# Patient Record
Sex: FEMALE | Race: WHITE | NOT HISPANIC OR LATINO | Employment: FULL TIME | URBAN - METROPOLITAN AREA
[De-identification: names, ages, dates, MRNs, and addresses within clinical notes are randomized per-mention and may not be internally consistent; named-entity substitution may affect disease eponyms.]

---

## 2024-02-07 ENCOUNTER — HOSPITAL ENCOUNTER (EMERGENCY)
Facility: HOSPITAL | Age: 19
Discharge: HOME | End: 2024-02-07
Payer: COMMERCIAL

## 2024-02-07 VITALS
SYSTOLIC BLOOD PRESSURE: 129 MMHG | OXYGEN SATURATION: 99 % | WEIGHT: 125 LBS | HEIGHT: 61 IN | HEART RATE: 80 BPM | RESPIRATION RATE: 16 BRPM | BODY MASS INDEX: 23.6 KG/M2 | DIASTOLIC BLOOD PRESSURE: 88 MMHG | TEMPERATURE: 98 F

## 2024-02-07 DIAGNOSIS — B34.9 VIRAL ILLNESS: Primary | ICD-10-CM

## 2024-02-07 LAB
FLUAV RNA RESP QL NAA+PROBE: NOT DETECTED
FLUBV RNA RESP QL NAA+PROBE: NOT DETECTED
SARS-COV-2 RNA RESP QL NAA+PROBE: NOT DETECTED

## 2024-02-07 PROCEDURE — 87636 SARSCOV2 & INF A&B AMP PRB: CPT

## 2024-02-07 PROCEDURE — 99283 EMERGENCY DEPT VISIT LOW MDM: CPT

## 2024-02-07 ASSESSMENT — COLUMBIA-SUICIDE SEVERITY RATING SCALE - C-SSRS
2. HAVE YOU ACTUALLY HAD ANY THOUGHTS OF KILLING YOURSELF?: NO
1. IN THE PAST MONTH, HAVE YOU WISHED YOU WERE DEAD OR WISHED YOU COULD GO TO SLEEP AND NOT WAKE UP?: NO
6. HAVE YOU EVER DONE ANYTHING, STARTED TO DO ANYTHING, OR PREPARED TO DO ANYTHING TO END YOUR LIFE?: NO

## 2024-02-07 ASSESSMENT — LIFESTYLE VARIABLES
HAVE PEOPLE ANNOYED YOU BY CRITICIZING YOUR DRINKING: NO
HAVE YOU EVER FELT YOU SHOULD CUT DOWN ON YOUR DRINKING: NO
EVER HAD A DRINK FIRST THING IN THE MORNING TO STEADY YOUR NERVES TO GET RID OF A HANGOVER: NO
EVER FELT BAD OR GUILTY ABOUT YOUR DRINKING: NO

## 2024-02-07 ASSESSMENT — PAIN - FUNCTIONAL ASSESSMENT: PAIN_FUNCTIONAL_ASSESSMENT: 0-10

## 2024-02-07 ASSESSMENT — PAIN SCALES - GENERAL: PAINLEVEL_OUTOF10: 0 - NO PAIN

## 2024-02-07 NOTE — ED PROVIDER NOTES
"HPI   Chief Complaint   Patient presents with    Flu Symptoms       Patient is a 18-year-old female with no reported past medical history who presents emergency room today with a complaint of flulike symptoms.  Patient states yesterday she felt \"pretty normal\" but this morning woke up with \"a cold\".  She endorses stuffy nose, generalized bodyaches and cough.  She states this afternoon while she was in class, she became extremely fatigued and had to \"lay down in the hallway.\"  She denies passing out or striking her head.  EMS was called and patient was brought to the emergency room.  She also complains of some diffuse neck pain.  She is up-to-date on all of her vaccinations.  She denies any chest pain, difficulty breathing, visual changes, nausea, vomiting, urinary urgency/frequency/burning, constipation or diarrhea.  No other complaints or concerns mentioned at this time.      History provided by:  Patient                      [place] Coma Scale Score: 15                     Patient History   No past medical history on file.  No past surgical history on file.  No family history on file.  Social History     Tobacco Use    Smoking status: Not on file    Smokeless tobacco: Not on file   Substance Use Topics    Alcohol use: Not on file    Drug use: Not on file       Physical Exam   ED Triage Vitals [02/07/24 1422]   Temperature Heart Rate Respirations BP   36.7 °C (98 °F) 80 16 129/88      Pulse Ox Temp src Heart Rate Source Patient Position   99 % -- -- --      BP Location FiO2 (%)     -- --       Physical Exam  Vitals and nursing note reviewed. Exam conducted with a chaperone present.   Constitutional:       General: She is not in acute distress.     Appearance: Normal appearance. She is normal weight. She is not ill-appearing, toxic-appearing or diaphoretic.   HENT:      Head: Normocephalic and atraumatic.      Nose: Congestion present.      Mouth/Throat:      Mouth: Mucous membranes are moist.      Pharynx: " "Oropharynx is clear. No oropharyngeal exudate or posterior oropharyngeal erythema.   Eyes:      Extraocular Movements: Extraocular movements intact.      Conjunctiva/sclera: Conjunctivae normal.      Pupils: Pupils are equal, round, and reactive to light.   Neck:      Comments: Generalized, diffuse tenderness.  No spinal or paraspinal tenderness  Cardiovascular:      Rate and Rhythm: Normal rate and regular rhythm.      Pulses: Normal pulses.      Heart sounds: Normal heart sounds.   Pulmonary:      Effort: Pulmonary effort is normal. No respiratory distress.      Breath sounds: Normal breath sounds. No stridor. No wheezing, rhonchi or rales.   Chest:      Chest wall: No tenderness.   Abdominal:      General: Abdomen is flat. Bowel sounds are normal.      Palpations: Abdomen is soft.   Genitourinary:     Comments: No CVA tenderness or pubic pain.  Musculoskeletal:         General: Normal range of motion.      Cervical back: Normal range of motion and neck supple. Tenderness present. No rigidity.   Lymphadenopathy:      Cervical: No cervical adenopathy.   Skin:     General: Skin is warm and dry.      Capillary Refill: Capillary refill takes less than 2 seconds.   Neurological:      General: No focal deficit present.      Mental Status: She is alert and oriented to person, place, and time. Mental status is at baseline.      Sensory: No sensory deficit.      Motor: No weakness.      Coordination: Coordination normal.      Gait: Gait normal.   Psychiatric:         Mood and Affect: Mood normal.         Judgment: Judgment normal.         ED Course & MDM   Diagnoses as of 02/07/24 1617   Viral illness       Medical Decision Making  Patient is a 18-year-old female with no reported past medical history who presents emergency room today with a complaint of flulike symptoms.  Patient states yesterday she felt \"pretty normal\" but this morning woke up with \"a cold\".  She endorses stuffy nose, generalized bodyaches and cough.  " "She states this afternoon while she was in class, she became extremely fatigued and had to \"lay down in the hallway.\"  She denies passing out or striking her head.  EMS was called and patient was brought to the emergency room.  She also complains of some diffuse neck pain.  She is up-to-date on all of her vaccinations.  She denies any chest pain, difficulty breathing, visual changes, nausea, vomiting, urinary urgency/frequency/burning, constipation or diarrhea.  Upon initial assessment patient is alert and oriented.  She is in no acute distress.  She is answering questions appropriately.  She is afebrile and appears well-hydrated.  Mucous membranes are moist.  Vitals are within normal limits.  Oxygen saturation on room air is 99%.  Lungs are clear in all fields bilaterally.  Exam is otherwise as described above.    DDx: Influenza A/B, COVID-19, meningitis, other    Workup initiated.  I for patient Tylenol for her body aches however she states she has some in her back that she can take.  She denies need for any nausea medication at this time.  Did consider meningitis however patient is current on her meningitis vaccination, she is afebrile does not complain of a severe headache, has a Glascow coma score 15 making my suspicion for this less likely.    The patient was signed out to Brigida Phillips CNP at 15:00. Viral panel is pending.  All questions and concerns addressed.  All decisions regarding the progression of care and interpretation of test will be made at their discretion.    Care of the patient was assumed from prior provider at 1500 pending the result of a viral panel.  The viral panel was negative for influenza, COVID, and RSV.  We believe the patient is most likely suffering from an acute viral illness.  The patient was advised to provide symptomatic care.  The patient is to use Tylenol and/or Motrin over-the-counter as directed.  The patient is to follow up with their primary care physician in the next 2-3 " days.  The patient is to return to the ED worse in any way.  The patient was discharged in stable condition with computer discharge instructions given. Patient was agreeable with discharge planning.    Brigida Phillips CNP    Amount and/or Complexity of Data Reviewed  Labs: ordered. Decision-making details documented in ED Course.        Procedure  Procedures     April SRIRAM Randle-LIZANDRO  02/07/24 1504       SRIRAM Sherwood-LIZANDRO  02/07/24 9705

## 2024-03-11 ENCOUNTER — HOSPITAL ENCOUNTER (OUTPATIENT)
Dept: RADIOLOGY | Facility: EXTERNAL LOCATION | Age: 19
Discharge: HOME | End: 2024-03-11

## 2024-03-11 DIAGNOSIS — M25.511 RIGHT SHOULDER PAIN, UNSPECIFIED CHRONICITY: ICD-10-CM
